# Patient Record
Sex: FEMALE | Race: WHITE | NOT HISPANIC OR LATINO | Employment: UNEMPLOYED | URBAN - METROPOLITAN AREA
[De-identification: names, ages, dates, MRNs, and addresses within clinical notes are randomized per-mention and may not be internally consistent; named-entity substitution may affect disease eponyms.]

---

## 2017-03-03 ENCOUNTER — GENERIC CONVERSION - ENCOUNTER (OUTPATIENT)
Dept: OTHER | Facility: OTHER | Age: 57
End: 2017-03-03

## 2017-05-26 ENCOUNTER — ALLSCRIPTS OFFICE VISIT (OUTPATIENT)
Dept: OTHER | Facility: OTHER | Age: 57
End: 2017-05-26

## 2017-05-26 DIAGNOSIS — Z12.31 ENCOUNTER FOR SCREENING MAMMOGRAM FOR MALIGNANT NEOPLASM OF BREAST: ICD-10-CM

## 2017-05-26 DIAGNOSIS — R92.2 INCONCLUSIVE MAMMOGRAM: ICD-10-CM

## 2018-01-09 NOTE — MISCELLANEOUS
Message   Recorded as Task   Date: 02/22/2016 10:47 AM, Created By: Goldy Ramsey   Task Name: Follow Up   Assigned To: Lauren Garcia   Regarding Patient: Kale Martin, Status: In Progress   CommentOleg Valerio - 22 Feb 2016 10:47 AM     TASK CREATED  Caller: Self; [phone] (Home); [phone] (Work)  see prior task  pt was to go back for 6m f/u US  script will not be mailed for reg screening  [facility] is aware of this    She called and lm for pt  did she go back for f/u? Vira Rios - 22 Feb 2016 10:49 AM     TASK EDITED  lmtcb  In Feb, pt was to go back in 6 mos for U/S and no record of it in chart  Did pt go back??   [facility] - 22 Feb 2016 10:49 AM     TASK IN PROGRESS   [facility] - 22 Feb 2016 2:09 PM     TASK EDITED  lmtcb   [facility] - 22 Feb 2016 2:41 PM     TASK EDITED  Pt did have mammo and u/s - f/u in aug  Am getting reports  Slip sent to pt for screening mammo   [facility] - 22 Feb 2016 3:24 PM     TASK EDITED  Mammo received from [facility]  Scanned into chart   Gloriantoinette Rios - 22 Feb 2016 3:26 PM     TASK EDITED  Pt absolutely "scared to death" to wait for mammo results once it is done  Irrational fear  I told pt to call [facility] and mri - see if mammo could be done there and get results immediately  Active Problems    1  Asymptomatic menopausal state (V49 81) (Z78 0)   2  Encounter for screening mammogram for malignant neoplasm of breast (V76 12)   (Z12 31)   3  Mammogram abnormal (793 80) (R92 8)   4  Visit for routine gyn exam (V72 31) (Z01 419)   5  Visit for screening mammogram (V76 12) (Z12 31)    Current Meds   1  ALPRAZolam 0 25 MG Oral Tablet; Therapy: [id number] to Recorded    Allergies    1  No Known Drug Allergies    Signatures   Electronically signed by :  Jules Shelton, ; Feb 22 2016  3:26PM EST                       (Author)

## 2018-01-11 NOTE — MISCELLANEOUS
Message   Recorded as Task   Date: 02/25/2016 01:07 PM, Created By: Mary Calvo   Task Name: Medical Complaint Callback   Assigned To: Rahel Fontenot   Regarding Patient: Haily Alva, Status: Active   Comment:    Mary Umesh - 25 Feb 2016 1:07 PM     TASK CREATED  pt called  She decided to go to [facility] anyway  Read last nurses note  She asked that rx be faxed to her home - at phone #  Script faxed        Active Problems    1  Asymptomatic menopausal state (V49 81) (Z78 0)   2  Encounter for screening mammogram for malignant neoplasm of breast (V76 12)   (Z12 31)   3  Mammogram abnormal (793 80) (R92 8)   4  Visit for routine gyn exam (V72 31) (Z01 419)   5  Visit for screening mammogram (V76 12) (Z12 31)    Current Meds   1  ALPRAZolam 0 25 MG Oral Tablet; Therapy: [id number] to Recorded    Allergies    1  No Known Drug Allergies    Signatures   Electronically signed by :  Carleen Shelton, ; Feb 25 2016  1:07PM EST                       (Author)

## 2018-01-14 VITALS
SYSTOLIC BLOOD PRESSURE: 138 MMHG | BODY MASS INDEX: 25.16 KG/M2 | DIASTOLIC BLOOD PRESSURE: 76 MMHG | HEIGHT: 63 IN | WEIGHT: 142 LBS

## 2018-07-08 PROBLEM — F41.0 PANIC DISORDER: Status: ACTIVE | Noted: 2017-03-16

## 2018-07-08 PROBLEM — F41.9 ANXIETY: Status: ACTIVE | Noted: 2017-03-16

## 2018-07-08 PROBLEM — R92.2 DENSE BREAST TISSUE: Status: ACTIVE | Noted: 2017-05-26

## 2018-07-08 PROBLEM — R92.30 DENSE BREAST TISSUE: Status: ACTIVE | Noted: 2017-05-26

## 2018-07-10 ENCOUNTER — ANNUAL EXAM (OUTPATIENT)
Dept: OBGYN CLINIC | Facility: MEDICAL CENTER | Age: 58
End: 2018-07-10
Payer: COMMERCIAL

## 2018-07-10 VITALS
RESPIRATION RATE: 16 BRPM | DIASTOLIC BLOOD PRESSURE: 90 MMHG | BODY MASS INDEX: 25.34 KG/M2 | SYSTOLIC BLOOD PRESSURE: 140 MMHG | WEIGHT: 143 LBS | HEIGHT: 63 IN

## 2018-07-10 DIAGNOSIS — Z12.31 ENCOUNTER FOR SCREENING MAMMOGRAM FOR MALIGNANT NEOPLASM OF BREAST: Primary | ICD-10-CM

## 2018-07-10 PROCEDURE — S0612 ANNUAL GYNECOLOGICAL EXAMINA: HCPCS | Performed by: OBSTETRICS & GYNECOLOGY

## 2018-07-10 RX ORDER — SERTRALINE HYDROCHLORIDE 25 MG/1
37.5 TABLET, FILM COATED ORAL DAILY
COMMUNITY

## 2018-07-10 NOTE — PROGRESS NOTES
7/10/2018    Assesment:    Problem List Items Addressed This Visit        Other    Encounter for screening mammogram for malignant neoplasm of breast - Primary      Her gyn exam is normal   a mammogram slip was given           Relevant Orders    Mammo screening bilateral w rohini Lira is a 62 y o  female who is here for a routine gyn exam,   has no new complaints   infrequent intercourse,  Not complete relief of dryness with use of lubricants       Patient Active Problem List   Diagnosis    Anxiety    Dense breast tissue    Mammogram abnormal    Panic disorder    Encounter for screening mammogram for malignant neoplasm of breast       Social History     Social History    Marital status: /Civil Union     Spouse name: N/A    Number of children: N/A    Years of education: N/A     Occupational History    Not on file  Social History Main Topics    Smoking status: Light Tobacco Smoker     Types: Cigarettes    Smokeless tobacco: Never Used      Comment: 1-9 cigs per day    Alcohol use No    Drug use: No    Sexual activity: Yes     Partners: Male     Birth control/ protection: Male Sterilization      Comment: partner had vasectomy     Other Topics Concern    Not on file     Social History Narrative    Daily coffee consumption (2 cups/day)    Exercising regularly    Lack of exercise         Family History   Problem Relation Age of Onset    Hypertension Mother     Hypertension Father     Lung cancer Father     Heart disease Maternal Grandfather      Past Medical History:   Diagnosis Date    Anxiety     Menopause     Normal delivery     1986 son       Current Outpatient Prescriptions:     sertraline (ZOLOFT) 25 mg tablet, Take 50 mg by mouth daily, Disp: , Rfl:       Bladder control: stress incontinence no                             urgency   no    1 Para 1001      admits to complaints with intercourse  Gynecologic History  No LMP recorded   Patient is postmenopausal   Last Pap: 2016  Results were: normal  Last mammogram: 2017  Results were: normal    The following portions of the patient's history were reviewed and updated as appropriate: allergies, current medications, past family history, past medical history, past social history, past surgical history and problem list     Review of Systems  Review of Systems   Constitutional: Negative for fatigue  Respiratory: Negative for shortness of breath  Cardiovascular: Negative for chest pain and palpitations  Gastrointestinal: Negative for abdominal distention, abdominal pain and constipation  Genitourinary: Negative for dyspareunia, frequency, hematuria and pelvic pain          Objective     /90 (BP Location: Left arm, Patient Position: Sitting, Cuff Size: Standard)   Resp 16   Ht 5' 3" (1 6 m)   Wt 64 9 kg (143 lb)   BMI 25 33 kg/m²     General Appearance:    Alert, cooperative, no distress, appears stated age                               Lungs:     Clear to auscultation bilaterally, respirations unlabored        Heart:    Regular rate and rhythm, S1 and S2 normal, no murmur, rub   or gallop   Breast Exam:  normal nipple, no mass, no skin change   Abdomen:     Soft, non-tender, bowel sounds active all four quadrants,     no masses, no organomegaly     Genitalia      :Vulva: normal, no lesions  Vagina: normal mucosa  Cervix: normal appearance  Uterus: normal size, normal shape and consistency  Adnexa: normal adnexa and no mass, fullness, tenderness     Rectal:   normal   Extremities:   Extremities normal, atraumatic, no cyanosis or edema       Skin:   Skin color, texture, turgor normal, no rashes or lesions   Lymph nodes:   Cervical, supraclavicular, and axillary nodes normal

## 2019-04-16 ENCOUNTER — TELEPHONE (OUTPATIENT)
Dept: OBGYN CLINIC | Facility: CLINIC | Age: 59
End: 2019-04-16

## 2019-04-16 DIAGNOSIS — R92.8 ABNORMAL MAMMOGRAM: ICD-10-CM

## 2019-04-16 DIAGNOSIS — R92.2 INCONCLUSIVE MAMMOGRAM: ICD-10-CM

## 2019-04-16 DIAGNOSIS — R92.2 DENSE BREAST TISSUE ON MAMMOGRAM: Primary | ICD-10-CM

## 2019-04-16 DIAGNOSIS — Z12.31 ENCOUNTER FOR SCREENING MAMMOGRAM FOR MALIGNANT NEOPLASM OF BREAST: ICD-10-CM

## 2019-08-05 ENCOUNTER — ANNUAL EXAM (OUTPATIENT)
Dept: OBGYN CLINIC | Facility: MEDICAL CENTER | Age: 59
End: 2019-08-05
Payer: COMMERCIAL

## 2019-08-05 VITALS — DIASTOLIC BLOOD PRESSURE: 76 MMHG | SYSTOLIC BLOOD PRESSURE: 130 MMHG | WEIGHT: 142.8 LBS | BODY MASS INDEX: 25.3 KG/M2

## 2019-08-05 DIAGNOSIS — Z01.419 ENCNTR FOR GYN EXAM (GENERAL) (ROUTINE) W/O ABN FINDINGS: Primary | ICD-10-CM

## 2019-08-05 DIAGNOSIS — Z12.39 SCREENING FOR MALIGNANT NEOPLASM OF BREAST: ICD-10-CM

## 2019-08-05 DIAGNOSIS — R92.8 ABNORMAL MAMMOGRAM: ICD-10-CM

## 2019-08-05 PROBLEM — R92.30 DENSE BREAST TISSUE: Status: RESOLVED | Noted: 2017-05-26 | Resolved: 2019-08-05

## 2019-08-05 PROBLEM — R92.2 DENSE BREAST TISSUE: Status: RESOLVED | Noted: 2017-05-26 | Resolved: 2019-08-05

## 2019-08-05 PROBLEM — Z12.31 ENCOUNTER FOR SCREENING MAMMOGRAM FOR MALIGNANT NEOPLASM OF BREAST: Status: RESOLVED | Noted: 2018-07-10 | Resolved: 2019-08-05

## 2019-08-05 PROCEDURE — S0612 ANNUAL GYNECOLOGICAL EXAMINA: HCPCS | Performed by: PHYSICIAN ASSISTANT

## 2019-08-05 RX ORDER — ALPRAZOLAM 0.25 MG/1
TABLET ORAL
Refills: 0 | COMMUNITY
Start: 2019-06-06

## 2019-08-05 NOTE — PROGRESS NOTES
Freddy Tara   1960    CC:  Yearly exam    S:  61 y o  female here for yearly exam  She is postmenopausal and has had no vaginal bleeding  She denies vaginal discharge, itching, odor or dryness  She is very infrequently sexually active with her ; they have been together since age 15 and she has always had pain with intercourse; she says this improved somewhat during their childbearing years but now is more of a problem  She uses coconut oil as a lubricant which she says works very well in terms of dryness  She has no pain at the introitus on initial penetration  She notes a deeper pain, like he is hitting a wall, when he is fully penetrated  She says her  saw vaginal dilators on the internet and suggested they try them  We discussed her issue as less likely treated with dilators since these are more for a diameter issue rather than a length issue  She does admit that she is very infrequently aroused prior to penetration and we discussed this as being more of the culprit  We discussed ways to become more aroused prior to penetration and she will try this  We discussed the use of a small bullet vibrator on her clitoris to help maintain arousal during intercourse  She says her  recently mentioned that his penis is developing a curve; I asked her to have him seen by urology for this  She is being followed for an abnormal mammogram earlier this year  We do not have a copy of her additional views, and once we receive those records, we will send her a slip for her next mammo    We reviewed ASCCP guidelines for Pap testing       Last Pap 5/20/16 neg/neg  Last Mammo 4/17/19  Last Colonoscopy 15 years ago     Family hx of breast cancer: no  Family hx of ovarian cancer: no  Family hx of colon cancer:  no    Current Outpatient Medications:     ALPRAZolam (XANAX) 0 25 mg tablet, , Disp: , Rfl: 0    sertraline (ZOLOFT) 25 mg tablet, Take 50 mg by mouth daily, Disp: , Rfl:   Social History Socioeconomic History    Marital status: /Civil Union     Spouse name: Not on file    Number of children: Not on file    Years of education: Not on file    Highest education level: Not on file   Occupational History    Not on file   Social Needs    Financial resource strain: Not on file    Food insecurity:     Worry: Not on file     Inability: Not on file    Transportation needs:     Medical: Not on file     Non-medical: Not on file   Tobacco Use    Smoking status: Light Tobacco Smoker     Types: Cigarettes    Smokeless tobacco: Never Used    Tobacco comment: 1-9 cigs per day   Substance and Sexual Activity    Alcohol use: No    Drug use: No    Sexual activity: Yes     Partners: Male     Birth control/protection: Male Sterilization     Comment: partner had vasectomy   Lifestyle    Physical activity:     Days per week: Not on file     Minutes per session: Not on file    Stress: Not on file   Relationships    Social connections:     Talks on phone: Not on file     Gets together: Not on file     Attends Yazidi service: Not on file     Active member of club or organization: Not on file     Attends meetings of clubs or organizations: Not on file     Relationship status: Not on file    Intimate partner violence:     Fear of current or ex partner: Not on file     Emotionally abused: Not on file     Physically abused: Not on file     Forced sexual activity: Not on file   Other Topics Concern    Not on file   Social History Narrative    Daily coffee consumption (2 cups/day)    Exercising regularly    Lack of exercise     Family History   Problem Relation Age of Onset    Hypertension Mother     Hypertension Father     Lung cancer Father     Heart disease Maternal Grandfather      Past Medical History:   Diagnosis Date    Anxiety     Menopause     Normal delivery     1986 son        Review of Systems   Respiratory: Negative  Cardiovascular: Negative      Gastrointestinal: Negative for constipation and diarrhea  Genitourinary: Negative for difficulty urinating, pelvic pain, vaginal bleeding, vaginal discharge, itching or odor  O:  Blood pressure 130/76, weight 64 8 kg (142 lb 12 8 oz)  Patient appears well and is not in distress  Neck is supple without masses  Breasts are symmetrical without mass, tenderness, nipple discharge, skin changes or adenopathy  Abdomen is soft and nontender without masses  External genitals are normal without lesions or rashes  Urethral meatus and urethra are normal  Bladder is normal to palpation  Vagina is normal without discharge or bleeding  Cervix is normal without discharge or lesion  Uterus is normal, mobile, nontender without palpable mass  Adnexa are normal, nontender, without palpable mass  A:   Yearly exam     Dyspareunia  P:   Pap 2021  Check left dx mammo and US in October 2019  RTO one year for yearly exam or sooner as needed

## 2019-08-06 DIAGNOSIS — R92.8 ABNORMAL MAMMOGRAM: ICD-10-CM

## 2019-10-18 ENCOUNTER — TELEPHONE (OUTPATIENT)
Dept: OBGYN CLINIC | Facility: CLINIC | Age: 59
End: 2019-10-18

## 2019-10-18 DIAGNOSIS — N60.02 CYST OF LEFT BREAST: Primary | ICD-10-CM

## 2019-10-18 NOTE — TELEPHONE ENCOUNTER
Patient called said she goes to [facility] to have her Mammograms done  Patient requested a mammo script as well as a script for an U/S of the left breast  Patient said she tried to schedule an appt an d was told she need a script

## 2020-08-17 ENCOUNTER — ANNUAL EXAM (OUTPATIENT)
Dept: OBGYN CLINIC | Facility: MEDICAL CENTER | Age: 60
End: 2020-08-17
Payer: COMMERCIAL

## 2020-08-17 VITALS
BODY MASS INDEX: 27.11 KG/M2 | HEIGHT: 63 IN | SYSTOLIC BLOOD PRESSURE: 160 MMHG | DIASTOLIC BLOOD PRESSURE: 82 MMHG | WEIGHT: 153 LBS | TEMPERATURE: 98.2 F

## 2020-08-17 DIAGNOSIS — Z12.31 ENCOUNTER FOR SCREENING MAMMOGRAM FOR MALIGNANT NEOPLASM OF BREAST: ICD-10-CM

## 2020-08-17 DIAGNOSIS — Z01.419 ENCNTR FOR GYN EXAM (GENERAL) (ROUTINE) W/O ABN FINDINGS: Primary | ICD-10-CM

## 2020-08-17 DIAGNOSIS — R92.8 ABNORMAL MAMMOGRAM: ICD-10-CM

## 2020-08-17 PROCEDURE — 87624 HPV HI-RISK TYP POOLED RSLT: CPT | Performed by: PHYSICIAN ASSISTANT

## 2020-08-17 PROCEDURE — G0145 SCR C/V CYTO,THINLAYER,RESCR: HCPCS | Performed by: PHYSICIAN ASSISTANT

## 2020-08-17 PROCEDURE — S0612 ANNUAL GYNECOLOGICAL EXAMINA: HCPCS | Performed by: PHYSICIAN ASSISTANT

## 2020-08-17 NOTE — PROGRESS NOTES
Clifford Juleitte   1960    CC:  Yearly exam    S:  61 y o  female here for yearly exam  She is postmenopausal and has had no vaginal bleeding  She denies vaginal discharge, itching, odor or dryness  Sexual activity: She is sexually active without pain, bleeding or dryness  Last Pap: 5/20/16 neg/neg  Last Mammo:  4/12/19 neg  Last Colonoscopy:  never     We reviewed ASC guidelines for Pap testing       Family hx of breast cancer: no  Family hx of ovarian cancer: no  Family hx of colon cancer: no    Current Outpatient Medications:     ALPRAZolam (XANAX) 0 25 mg tablet, , Disp: , Rfl: 0    sertraline (ZOLOFT) 25 mg tablet, Take 37 5 mg by mouth daily , Disp: , Rfl:   Social History     Socioeconomic History    Marital status: /Civil Union     Spouse name: Not on file    Number of children: Not on file    Years of education: Not on file    Highest education level: Not on file   Occupational History    Not on file   Social Needs    Financial resource strain: Not on file    Food insecurity     Worry: Not on file     Inability: Not on file   [employer] needs     Medical: Not on file     Non-medical: Not on file   Tobacco Use    Smoking status: Current Every Day Smoker     Types: Cigarettes    Smokeless tobacco: Never Used    Tobacco comment: 1-9 cigs per day   Substance and Sexual Activity    Alcohol use: No    Drug use: No    Sexual activity: Yes     Partners: Male     Birth control/protection: Male Sterilization     Comment: partner had vasectomy   Lifestyle    Physical activity     Days per week: Not on file     Minutes per session: Not on file    Stress: Not on file   Relationships    Social connections     Talks on phone: Not on file     Gets together: Not on file     Attends Church service: Not on file     Active member of club or organization: Not on file     Attends meetings of clubs or organizations: Not on file     Relationship status: Not on file    Intimate partner violence     Fear of current or ex partner: Not on file     Emotionally abused: Not on file     Physically abused: Not on file     Forced sexual activity: Not on file   Other Topics Concern    Not on file   Social History Narrative    Daily coffee consumption (2 cups/day)    Exercising regularly    Lack of exercise     Family History   Problem Relation Age of Onset    Hypertension Mother     Hypertension Father     Lung cancer Father     Heart disease Maternal Grandfather      Past Medical History:   Diagnosis Date    Anxiety     Menopause     Normal delivery     1986 son        Review of Systems   Respiratory: Negative  Cardiovascular: Negative  Gastrointestinal: Negative for constipation and diarrhea  Genitourinary: Negative for difficulty urinating, pelvic pain, vaginal bleeding, vaginal discharge, itching or odor  O:  Blood pressure 160/82, temperature 98 2 °F (36 8 °C), temperature source Temporal, height 5' 2 99" (1 6 m), weight 69 4 kg (153 lb)  Patient appears well and is not in distress  Neck is supple without masses  Breasts are symmetrical without mass, tenderness, nipple discharge, skin changes or adenopathy  Abdomen is soft and nontender without masses  External genitals are normal without lesions or rashes  Urethral meatus and urethra are normal  Bladder is normal to palpation  Vagina is normal without discharge or bleeding  Cervix is normal without discharge or lesion  Uterus is normal, mobile, nontender without palpable mass  Adnexa are normal, nontender, without palpable mass  A:  Yearly exam      P:   Pap and HPV today   Mammo slip given   Colonoscopy due - plans to do Cologuard     RTO one year for yearly exam or sooner as needed

## 2020-08-18 LAB
HPV HR 12 DNA CVX QL NAA+PROBE: NEGATIVE
HPV16 DNA CVX QL NAA+PROBE: NEGATIVE
HPV18 DNA CVX QL NAA+PROBE: NEGATIVE

## 2020-08-20 LAB
LAB AP GYN PRIMARY INTERPRETATION: NORMAL
Lab: NORMAL

## 2020-10-16 ENCOUNTER — APPOINTMENT (OUTPATIENT)
Dept: ULTRASOUND IMAGING | Facility: CLINIC | Age: 60
End: 2020-10-16
Payer: COMMERCIAL

## 2020-10-16 ENCOUNTER — HOSPITAL ENCOUNTER (OUTPATIENT)
Dept: MAMMOGRAPHY | Facility: CLINIC | Age: 60
Discharge: HOME | End: 2020-10-16
Payer: COMMERCIAL

## 2020-10-16 VITALS — BODY MASS INDEX: 27.11 KG/M2 | TEMPERATURE: 97.8 F | WEIGHT: 153 LBS | HEIGHT: 63 IN

## 2020-10-16 DIAGNOSIS — Z12.31 ENCOUNTER FOR SCREENING MAMMOGRAM FOR MALIGNANT NEOPLASM OF BREAST: ICD-10-CM

## 2020-10-16 PROCEDURE — 77063 BREAST TOMOSYNTHESIS BI: CPT

## 2020-10-16 PROCEDURE — 77067 SCR MAMMO BI INCL CAD: CPT

## 2021-10-25 ENCOUNTER — ANNUAL EXAM (OUTPATIENT)
Dept: OBGYN CLINIC | Facility: MEDICAL CENTER | Age: 61
End: 2021-10-25
Payer: COMMERCIAL

## 2021-10-25 VITALS
WEIGHT: 149.8 LBS | HEIGHT: 63 IN | BODY MASS INDEX: 26.54 KG/M2 | DIASTOLIC BLOOD PRESSURE: 100 MMHG | SYSTOLIC BLOOD PRESSURE: 164 MMHG

## 2021-10-25 DIAGNOSIS — Z12.11 COLON CANCER SCREENING: ICD-10-CM

## 2021-10-25 DIAGNOSIS — Z12.31 ENCOUNTER FOR SCREENING MAMMOGRAM FOR MALIGNANT NEOPLASM OF BREAST: ICD-10-CM

## 2021-10-25 DIAGNOSIS — Z01.419 ENCNTR FOR GYN EXAM (GENERAL) (ROUTINE) W/O ABN FINDINGS: Primary | ICD-10-CM

## 2021-10-25 PROCEDURE — S0612 ANNUAL GYNECOLOGICAL EXAMINA: HCPCS | Performed by: PHYSICIAN ASSISTANT

## 2022-03-02 ENCOUNTER — TELEPHONE (OUTPATIENT)
Dept: OBGYN CLINIC | Facility: CLINIC | Age: 62
End: 2022-03-02

## 2022-03-08 ENCOUNTER — HOSPITAL ENCOUNTER (OUTPATIENT)
Dept: MAMMOGRAPHY | Facility: CLINIC | Age: 62
Discharge: HOME | End: 2022-03-08
Payer: COMMERCIAL

## 2022-03-08 VITALS — BODY MASS INDEX: 26.4 KG/M2 | HEIGHT: 63 IN | WEIGHT: 149 LBS

## 2022-03-08 DIAGNOSIS — Z12.31 ENCOUNTER FOR SCREENING MAMMOGRAM FOR MALIGNANT NEOPLASM OF BREAST: ICD-10-CM

## 2022-03-08 PROCEDURE — 77067 SCR MAMMO BI INCL CAD: CPT

## 2022-03-08 PROCEDURE — 77063 BREAST TOMOSYNTHESIS BI: CPT

## 2023-01-03 ENCOUNTER — ANNUAL EXAM (OUTPATIENT)
Dept: OBGYN CLINIC | Facility: MEDICAL CENTER | Age: 63
End: 2023-01-03

## 2023-01-03 VITALS
SYSTOLIC BLOOD PRESSURE: 160 MMHG | WEIGHT: 152.4 LBS | DIASTOLIC BLOOD PRESSURE: 96 MMHG | BODY MASS INDEX: 27 KG/M2 | HEIGHT: 63 IN

## 2023-01-03 DIAGNOSIS — Z12.31 ENCOUNTER FOR SCREENING MAMMOGRAM FOR MALIGNANT NEOPLASM OF BREAST: ICD-10-CM

## 2023-01-03 DIAGNOSIS — Z01.419 ROUTINE GYNECOLOGICAL EXAMINATION: Primary | ICD-10-CM

## 2023-01-03 DIAGNOSIS — R03.0 ELEVATED BLOOD PRESSURE READING IN OFFICE WITHOUT DIAGNOSIS OF HYPERTENSION: ICD-10-CM

## 2023-01-03 DIAGNOSIS — Z12.11 ENCOUNTER FOR SCREENING FOR MALIGNANT NEOPLASM OF COLON: ICD-10-CM

## 2023-01-03 DIAGNOSIS — N95.2 ATROPHIC VAGINITIS: ICD-10-CM

## 2023-01-03 NOTE — PROGRESS NOTES
Assessment   58 y o  postmenopausal female presenting for annual exam      Plan   Diagnoses and all orders for this visit:    Routine gynecological examination  Normal findings on routine exam    considerations reviewed  Aware of sx to report  Breast awareness/SBE encouraged  Encouraged 150 min of exercise per week  Reviewed balanced diet  Vitamin D and Calcium supplement recommended  Encounter for screening for malignant neoplasm of colon  -     Ambulatory referral to Gastroenterology; Future    She is due for a colonoscopy  We discussed this in detail today  She is aware that colonoscopy is the gold standard for detecting colon cancer  She is aware that colon cancer is generally regarded as a preventable cancer if polyps are found prior to turning cancerous  We also discussed yearly FIT testing versus q 3 year Cologuard testing for patients who are considered low-risk  She was strongly recommended to schedule colon cancer screening as soon as possible  Encounter for screening mammogram for malignant neoplasm of breast  -     Mammo screening bilateral w 3d & cad; Future    Elevated blood pressure reading in office without diagnosis of hypertension    170/96 on arrival, decreased to 160/96 at discharge  Admittedly is very anxious - has to take a xanax before she comes for doctors appts  Also has been very stressed due to her MIL and mother both having dementia and being their power of   She is not symptomatic of this elevation  Will plan to have rechecked by her PCP  Aware of sx to report  Atrophic vaginitis  Exam consistent with atrophic vaginitis  We discussed options to relieve sx     We reviewed various tx options to include use of lubricant (silicone based or coconut oil) during IC and prevention with coconut oil, hyaluronic acid suppository, and/or topical estrogen cream She declines to start estrace therapy at this time but is aware to call if this is something she desires in the future  She will trial other measures and report back if sx not relieving with this  Pap due   Mammo slip given   Colonoscopy due - referral given      RTO one year for yearly exam or sooner as needed  __________________________________________________________________      Subjective     Noemí Fountain is a 58 y o  postmenopausal female presenting for annual exam  She is without complaint and does not want STD testing today  Increased stress recently due to her mother and MIL both having late stages of dementia  She and  are their power of   Admittedly does not have much time for herself because of this  SCREENING  Last Pap: 2020 NILM/hpv neg  Last Mammo: 2022 BIRADS 2 - Benign  Last Colonoscopy: about 15 years ago  Last DEXA: never    GYN  Denies postmenopausal vaginal bleeding, dryness, vaginal discharge, itching, odor, pelvic pain and vulvar/vaginal symptoms    Menarche at age 15  Menopause at age 52  Menopausal symptoms denies    Sexually active: Yes - single partner -   Sexual concerns - reports dryness - only somewhat relived with use of coconut oil  Was active with her  last night  They use a new toy that was very irritation and notably has some soreness of vulva today  Hx STI: denies     Hx Abnormal pap: denies  We reviewed ASCCP guidelines for Pap testing today  OB          Complaints: denies  Denies leakage/difficulty urinating, dysuria, hematuria, and urinary frequency/urgency      BREAST  Complaints: denies  Denies: breast lump, breast tenderness, dryness, nipple discharge, pruritus, skin color change, and skin lesion(s)  Personal hx: none reported    GENERAL  PMH reviewed/updated and is as below  Patient does follow with a PCP      Pertinent Family Hx:   Family hx of breast cancer: PGM, paternal aunt, maternal aunt x 2  Family hx of ovarian cancer: no  Family hx of colon cancer: no      Past Medical History:   Diagnosis Date   • Anxiety    • Menopause    • Normal delivery     1986 son       Past Surgical History:   Procedure Laterality Date   • COLONOSCOPY      15 years ago   • DILATION AND CURETTAGE OF UTERUS     • LAPAROSCOPY  10/09/1987    chronic pelvic pain         Current Outpatient Medications:   •  ALPRAZolam (XANAX) 0 25 mg tablet, , Disp: , Rfl: 0  •  sertraline (ZOLOFT) 25 mg tablet, Take 37 5 mg by mouth daily , Disp: , Rfl:     No Known Allergies    Social History     Socioeconomic History   • Marital status: /Civil Union     Spouse name: Not on file   • Number of children: Not on file   • Years of education: Not on file   • Highest education level: Not on file   Occupational History   • Not on file   Tobacco Use   • Smoking status: Every Day     Packs/day: 0 50     Years: 40 00     Pack years: 20 00     Types: Cigarettes   • Smokeless tobacco: Never   • Tobacco comments:     1-9 cigs per day   Vaping Use   • Vaping Use: Never used   Substance and Sexual Activity   • Alcohol use: No   • Drug use: No   • Sexual activity: Yes     Partners: Male     Birth control/protection: Male Sterilization     Comment: partner had vasectomy   Other Topics Concern   • Not on file   Social History Narrative    Daily coffee consumption (2 cups/day)    Exercising regularly    Lack of exercise     Social Determinants of Health     Financial Resource Strain: Not on file   Food Insecurity: Not on file   Transportation Needs: Not on file   Physical Activity: Not on file   Stress: Not on file   Social Connections: Not on file   Intimate Partner Violence: Not on file   Housing Stability: Not on file         Review of Systems     ROS:  Constitutional: Negative for appetite change, fatigue and unexpected weight change  Respiratory: Negative  Cardiovascular: Negative  Gastrointestinal: Negative for abdominal pain and change in bowel habits/constipation/diarrhea  Breasts:  Negative other than as noted above    Genitourinary: Negative other than as noted above  Psychiatric: Negative for mood difficulties  Objective      /96   Ht 5' 2 75" (1 594 m)   Wt 69 1 kg (152 lb 6 4 oz)   BMI 27 21 kg/m²         Physical Exam  Genitourinary:         Comments: Atrophic changes noted  Labia minora are fused b/l with abrasions as noted above consistent with hx of intercourse last night  Patient appears well and is not in distress  Neck is supple without masses  Breasts are symmetrical without mass, tenderness, nipple discharge, skin changes or adenopathy  Abdomen is soft and nontender without masses  Urethral meatus and urethra are normal  Bladder is normal to palpation  Vagina is without discharge or bleeding  Cervix is without discharge or lesion  Uterus is normal, mobile, nontender without palpable mass  Adnexa are normal, nontender, without palpable mass

## 2023-01-03 NOTE — PATIENT INSTRUCTIONS
For vaginal dryness (prevention)    Coconut oil (organic, pure, unscented) as needed for moisture or lubrication  ( Do not use if allergic)  Revaree hyaluronic acid suppository   Replens moisture restore external comfort gel daily ( use as directed on the box)     Replens long lasting vaginal moisturizer  ( use as directed on the box)       For Vaginal Lubrication (during intercourse)      Coconut oil (Do not use if allergic)           Silicone based lubricant:  Uber Lube  AstroGlide  Replens silky smooth lubricant, premium silicone based lubricant for intercourse  ( use as directed, a small amount will provide an enhanced natural feeling)        Hyaluronic acid suppository - Revaree from [employer]     To order: go to the website- [facility]  Or call or text [phone]  Select product  Use coupon code [id number] for discount on first prescription

## 2023-09-27 ENCOUNTER — HOSPITAL ENCOUNTER (OUTPATIENT)
Dept: RADIOLOGY | Facility: MEDICAL CENTER | Age: 63
Discharge: HOME | End: 2023-09-27
Payer: COMMERCIAL

## 2023-09-27 VITALS — WEIGHT: 152 LBS | BODY MASS INDEX: 26.93 KG/M2 | HEIGHT: 63 IN

## 2023-09-27 DIAGNOSIS — Z12.31 ENCOUNTER FOR SCREENING MAMMOGRAM FOR MALIGNANT NEOPLASM OF BREAST: ICD-10-CM

## 2023-09-27 PROCEDURE — 77063 BREAST TOMOSYNTHESIS BI: CPT

## 2023-09-27 PROCEDURE — 77067 SCR MAMMO BI INCL CAD: CPT

## 2024-02-26 NOTE — PROGRESS NOTES
Assessment   63 y.o. postmenopausal female presenting for annual exam.     Plan   Diagnoses and all orders for this visit:    Routine gynecological examination  Normal findings on routine exam.   considerations reviewed. Aware of sx to report.   Breast awareness/SBE encouraged.  Encouraged 150 min of exercise per week.  Reviewed balanced diet.   Vitamin D and Calcium supplement recommended.     Encounter for screening mammogram for malignant neoplasm of breast  -     Mammo screening bilateral w 3d & cad; Future    Vulvar irritation    - clobetasol (TEMOVATE) 0.05 % ointment; Apply once daily for 4-6 weeks  Dispense: 30 g; Refill: 1    Reviewed irritation of left labia minora on exam. Was present at last annual and believed to be due to friction from intercourse. Declines estrace therapy. Recommended once daily application of clobetasol ointment x 4 weeks. RTO for recheck. Discussed potential biopsy if unchanged. Agreeable.     Pap - due 2025  Mammo slip given   Colonoscopy due 2026      RTO one year for yearly exam or sooner as needed.      __________________________________________________________________      Subjective     Delaneysyl Anderson is a 63 y.o. postmenopausal female presenting for annual exam.     Rough time recently as mom and mother-in-law passed away within 6 months of each other. They were 89. 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   Not exercising regularly. Knows she needs to and wants to walk more.     SCREENING  Last Pap: 08/17/2020 NILM/hpv neg   Last Mammo: 09/27/2023 BIRADS 2 - Benign  Last Colonoscopy: 11/3/2023 3 year recall   Last DEXA: n/a     GYN  Denies postmenopausal vaginal bleeding, dryness, vaginal discharge, itching, odor, pelvic pain and vulvar/vaginal symptoms    Sexually active: Yes - single partner -   Concerns: some dryness. Managed with use of a lubricant. Declines estrogen treatment. Denies pain or bleeding  STD  testing declines    Hx Abnormal pap: denies  We reviewed ASCCP guidelines for Pap testing today.       OB        Complaints: denies  Denies leakage/difficulty urinating, dysuria, hematuria, and urinary frequency/urgency    BREAST  Complaints: denies  Denies: breast lump, breast tenderness, dryness, nipple discharge, pruritus, skin color change, and skin lesion(s)    Pertinent Family Hx:   Family hx of breast cancer: PGM, paternal aunt, maternal aunt x 2  Family hx of ovarian cancer: no  Family hx of colon cancer: no      Past Medical History:   Diagnosis Date    Anxiety     Menopause     Normal delivery     1986 son       Past Surgical History:   Procedure Laterality Date    COLONOSCOPY      15 years ago    DILATION AND CURETTAGE OF UTERUS      LAPAROSCOPY  10/09/1987    chronic pelvic pain         Current Outpatient Medications:     ALPRAZolam (XANAX) 0.25 mg tablet, if needed for anxiety, Disp: , Rfl: 0    clobetasol (TEMOVATE) 0.05 % ointment, Apply once daily for 4-6 weeks, Disp: 30 g, Rfl: 1    sertraline (ZOLOFT) 25 mg tablet, Take 37.5 mg by mouth daily , Disp: , Rfl:     No Known Allergies    Social History     Socioeconomic History    Marital status: /Civil Union     Spouse name: Not on file    Number of children: Not on file    Years of education: Not on file    Highest education level: Not on file   Occupational History    Not on file   Tobacco Use    Smoking status: Every Day     Current packs/day: 0.50     Average packs/day: 0.5 packs/day for 40.0 years (20.0 ttl pk-yrs)     Types: Cigarettes    Smokeless tobacco: Never    Tobacco comments:     1-9 cigs per day   Vaping Use    Vaping status: Never Used   Substance and Sexual Activity    Alcohol use: No    Drug use: No    Sexual activity: Yes     Partners: Male     Birth control/protection: Male Sterilization, Post-menopausal     Comment: partner had vasectomy   Other Topics Concern    Not on file   Social History Narrative    Daily  "coffee consumption (2 cups/day)    Exercising regularly    Lack of exercise     Social Determinants of Health     Financial Resource Strain: Not on file   Food Insecurity: Not on file   Transportation Needs: Not on file   Physical Activity: Not on file   Stress: Not on file   Social Connections: Not on file   Intimate Partner Violence: Not on file   Housing Stability: Not on file         Review of Systems   Constitutional: Negative.   Respiratory: Negative.    Cardiovascular: Negative   Gastrointestinal: Negative   Breasts: As noted above.   Genitourinary: As noted above.   Psychiatric: Negative       Objective      /70 (BP Location: Right arm, Patient Position: Sitting, Cuff Size: Standard)   Pulse (!) 111   Ht 5' 3\" (1.6 m)   Wt 66.7 kg (147 lb)   BMI 26.04 kg/m²     Physical Examination:  Patient appears well and is not in distress  Breasts are symmetrical without mass, tenderness, nipple discharge, skin changes or adenopathy.   Abdomen is soft and nontender without masses.   External genitals- cord like erythema of the upper 1/2 of the left labia minora.   Urethral meatus and urethra are normal  Bladder is normal to palpation  Vagina is normal without discharge or bleeding.   Cervix is normal without discharge or lesion.   Uterus is normal, mobile, nontender without palpable mass.  Adnexa are normal, nontender, without palpable mass.     "

## 2024-02-27 ENCOUNTER — ANNUAL EXAM (OUTPATIENT)
Dept: OBGYN CLINIC | Facility: MEDICAL CENTER | Age: 64
End: 2024-02-27
Payer: COMMERCIAL

## 2024-02-27 VITALS
DIASTOLIC BLOOD PRESSURE: 70 MMHG | WEIGHT: 147 LBS | HEART RATE: 111 BPM | SYSTOLIC BLOOD PRESSURE: 150 MMHG | BODY MASS INDEX: 26.05 KG/M2 | HEIGHT: 63 IN

## 2024-02-27 DIAGNOSIS — N90.89 VULVAR IRRITATION: ICD-10-CM

## 2024-02-27 DIAGNOSIS — Z12.31 ENCOUNTER FOR SCREENING MAMMOGRAM FOR MALIGNANT NEOPLASM OF BREAST: ICD-10-CM

## 2024-02-27 DIAGNOSIS — Z01.419 ROUTINE GYNECOLOGICAL EXAMINATION: Primary | ICD-10-CM

## 2024-02-27 PROCEDURE — 99396 PREV VISIT EST AGE 40-64: CPT | Performed by: PHYSICIAN ASSISTANT

## 2024-02-27 RX ORDER — CLOBETASOL PROPIONATE 0.5 MG/G
OINTMENT TOPICAL
Qty: 30 G | Refills: 1 | Status: SHIPPED | OUTPATIENT
Start: 2024-02-27

## 2024-04-09 ENCOUNTER — OFFICE VISIT (OUTPATIENT)
Dept: OBGYN CLINIC | Facility: MEDICAL CENTER | Age: 64
End: 2024-04-09
Payer: COMMERCIAL

## 2024-04-09 VITALS — BODY MASS INDEX: 26.32 KG/M2 | WEIGHT: 148.6 LBS

## 2024-04-09 DIAGNOSIS — N90.89 VULVAR IRRITATION: Primary | ICD-10-CM

## 2024-04-09 PROCEDURE — 99212 OFFICE O/P EST SF 10 MIN: CPT | Performed by: PHYSICIAN ASSISTANT

## 2024-04-09 NOTE — PROGRESS NOTES
Delaney FLORES Saco  1960      S:  63 y.o. female here for follow up of vulvar irritation. Has some erythema of left labia minora present on annual exam 2/27/2024, which was also noted on prior annual. Initially thought to be irritation from recent intercourse, however, with persistence was advised trial of steroid application. Has been applying clobetasol ointment once daily for the past 6 weeks. As she was asymptomatic to begin with, unsure if there has been any change.    Is sexually active with her . Uses lubricant for vaginal dryness, but has no pain.     Current Outpatient Medications:     ALPRAZolam (XANAX) 0.25 mg tablet, if needed for anxiety, Disp: , Rfl: 0    clobetasol (TEMOVATE) 0.05 % ointment, Apply once daily for 4-6 weeks, Disp: 30 g, Rfl: 1    sertraline (ZOLOFT) 25 mg tablet, Take 37.5 mg by mouth daily , Disp: , Rfl:   Social History     Socioeconomic History    Marital status: /Civil Union     Spouse name: Not on file    Number of children: Not on file    Years of education: Not on file    Highest education level: Not on file   Occupational History    Not on file   Tobacco Use    Smoking status: Every Day     Current packs/day: 0.50     Average packs/day: 0.5 packs/day for 40.0 years (20.0 ttl pk-yrs)     Types: Cigarettes    Smokeless tobacco: Never    Tobacco comments:     1-9 cigs per day   Vaping Use    Vaping status: Never Used   Substance and Sexual Activity    Alcohol use: No    Drug use: No    Sexual activity: Yes     Partners: Male     Birth control/protection: Male Sterilization, Post-menopausal     Comment: partner had vasectomy   Other Topics Concern    Not on file   Social History Narrative    Daily coffee consumption (2 cups/day)    Exercising regularly    Lack of exercise     Social Determinants of Health     Financial Resource Strain: Not on file   Food Insecurity: Not on file   Transportation Needs: Not on file   Physical Activity: Not on file   Stress: Not on file    Social Connections: Not on file   Intimate Partner Violence: Not on file   Housing Stability: Not on file     Family History   Problem Relation Age of Onset    Hypertension Mother     Heart failure Mother         CHF    Seizures Mother     Hypertension Father     Lung cancer Father     Cancer Father     No Known Problems Sister     No Known Problems Sister     Stomach cancer Maternal Grandmother     Heart disease Maternal Grandfather     Heart attack Maternal Grandfather     Breast cancer Paternal Grandmother     Brain cancer Paternal Grandfather     Cancer Maternal Aunt     Breast cancer Maternal Aunt         70's    Breast cancer Maternal Aunt 70    Breast cancer Paternal Aunt     Pancreatic cancer Paternal Aunt     Lung cancer Paternal Aunt      Past Medical History:   Diagnosis Date    Anxiety     Menopause     Normal delivery     1986 son       ROS:  Constitutional: Negative.  Genitourinary:  + vulvar irritation. Negative for difficulty urinating, frequency, urgency, pelvic pain, vaginal discharge, odor or itching.      O:  Weight 67.4 kg (148 lb 9.6 oz).    She appears well and is in no distress  Normocephalic, atraumatic.   Abdomen is soft and nontender  External genitals are normal without lesions or rashes. Previously noted vulvar irritation appears to have resolved. Varicosity noted in this area, that does appear to be swollen/ prominent, creating a skinfold that extends beyond labia.   No focal neurological deficits.   Normal mood, affect, and behavior.     A/P:  Diagnoses and all orders for this visit:    Vulvar irritation      Has resolved. Suspect that prominence of tissue creates an area of recurrent friction from IC, clothing, activity, etc. Encouraged to avoid tight fitting clothing and go without underwear when possible. Recommended continued monitoring. F/u with any irritation or concerns. Otherwise RTO for annual exam, sooner prn.

## 2024-06-10 DIAGNOSIS — N90.89 VULVAR IRRITATION: ICD-10-CM

## 2024-06-10 RX ORDER — CLOBETASOL PROPIONATE 0.5 MG/G
OINTMENT TOPICAL
Qty: 30 G | Refills: 1 | Status: SHIPPED | OUTPATIENT
Start: 2024-06-10

## 2024-06-24 DIAGNOSIS — N90.89 VULVAR IRRITATION: ICD-10-CM

## 2024-06-24 RX ORDER — CLOBETASOL PROPIONATE 0.5 MG/G
OINTMENT TOPICAL
Qty: 30 G | Refills: 1 | OUTPATIENT
Start: 2024-06-24

## 2024-07-06 DIAGNOSIS — N90.89 VULVAR IRRITATION: ICD-10-CM

## 2024-07-08 RX ORDER — CLOBETASOL PROPIONATE 0.5 MG/G
OINTMENT TOPICAL
Qty: 30 G | Refills: 1 | Status: SHIPPED | OUTPATIENT
Start: 2024-07-08

## 2024-07-22 DIAGNOSIS — N90.89 VULVAR IRRITATION: ICD-10-CM

## 2024-07-22 RX ORDER — CLOBETASOL PROPIONATE 0.5 MG/G
OINTMENT TOPICAL
Qty: 30 G | Refills: 1 | Status: SHIPPED | OUTPATIENT
Start: 2024-07-22

## 2024-09-17 DIAGNOSIS — N90.89 VULVAR IRRITATION: ICD-10-CM

## 2024-09-18 RX ORDER — CLOBETASOL PROPIONATE 0.5 MG/G
OINTMENT TOPICAL
Qty: 30 G | Refills: 1 | Status: SHIPPED | OUTPATIENT
Start: 2024-09-18

## 2024-11-02 DIAGNOSIS — N90.89 VULVAR IRRITATION: ICD-10-CM

## 2024-11-04 RX ORDER — CLOBETASOL PROPIONATE 0.5 MG/G
OINTMENT TOPICAL
Qty: 30 G | Refills: 1 | Status: SHIPPED | OUTPATIENT
Start: 2024-11-04

## 2024-12-18 DIAGNOSIS — N90.89 VULVAR IRRITATION: ICD-10-CM

## 2024-12-18 RX ORDER — CLOBETASOL PROPIONATE 0.5 MG/G
OINTMENT TOPICAL
Qty: 30 G | Refills: 5 | Status: SHIPPED | OUTPATIENT
Start: 2024-12-18

## 2025-02-10 ENCOUNTER — HOSPITAL ENCOUNTER (OUTPATIENT)
Dept: RADIOLOGY | Facility: MEDICAL CENTER | Age: 65
Discharge: HOME | End: 2025-02-10
Payer: COMMERCIAL

## 2025-02-10 VITALS — HEIGHT: 63 IN | WEIGHT: 148 LBS | BODY MASS INDEX: 26.22 KG/M2

## 2025-02-10 DIAGNOSIS — Z12.31 ENCOUNTER FOR SCREENING MAMMOGRAM FOR MALIGNANT NEOPLASM OF BREAST: ICD-10-CM

## 2025-02-10 PROCEDURE — 77063 BREAST TOMOSYNTHESIS BI: CPT

## 2025-02-10 PROCEDURE — 77067 SCR MAMMO BI INCL CAD: CPT

## 2025-04-01 ENCOUNTER — ANNUAL EXAM (OUTPATIENT)
Dept: OBGYN CLINIC | Facility: MEDICAL CENTER | Age: 65
End: 2025-04-01
Payer: COMMERCIAL

## 2025-04-01 VITALS
SYSTOLIC BLOOD PRESSURE: 136 MMHG | DIASTOLIC BLOOD PRESSURE: 94 MMHG | WEIGHT: 147 LBS | HEIGHT: 62 IN | BODY MASS INDEX: 27.05 KG/M2

## 2025-04-01 DIAGNOSIS — Z11.51 SCREENING FOR HUMAN PAPILLOMAVIRUS (HPV): ICD-10-CM

## 2025-04-01 DIAGNOSIS — Z12.31 ENCOUNTER FOR SCREENING MAMMOGRAM FOR MALIGNANT NEOPLASM OF BREAST: ICD-10-CM

## 2025-04-01 DIAGNOSIS — Z01.419 ENCOUNTER FOR ANNUAL ROUTINE GYNECOLOGICAL EXAMINATION: Primary | ICD-10-CM

## 2025-04-01 DIAGNOSIS — Z12.4 SCREENING FOR CERVICAL CANCER: ICD-10-CM

## 2025-04-01 DIAGNOSIS — N90.89 VULVAR IRRITATION: ICD-10-CM

## 2025-04-01 PROCEDURE — G0476 HPV COMBO ASSAY CA SCREEN: HCPCS | Performed by: PHYSICIAN ASSISTANT

## 2025-04-01 PROCEDURE — G0145 SCR C/V CYTO,THINLAYER,RESCR: HCPCS | Performed by: PHYSICIAN ASSISTANT

## 2025-04-01 PROCEDURE — 99396 PREV VISIT EST AGE 40-64: CPT | Performed by: PHYSICIAN ASSISTANT

## 2025-04-01 RX ORDER — CLOBETASOL PROPIONATE 0.5 MG/G
OINTMENT TOPICAL
Qty: 30 G | Refills: 3 | Status: SHIPPED | OUTPATIENT
Start: 2025-04-01

## 2025-04-01 NOTE — PROGRESS NOTES
Name: Delaney Anderson      : 1960      MRN: [id number]  Encounter Provider: Jen Calix PA-C  Encounter Date: 2025   Encounter department: [facility]  :  Assessment & Plan  Encounter for annual routine gynecological examination  Cervical cancer screening:  Normal cervical exam. Pap done today.    STD screening:  Patient declines.   Breast cancer screening:  Normal breast exam. Reviewed breast self-awareness. Mammo ordered.   Colon cancer screening:  Up to date. Due   Depression Screening: Patient's depression screening was assessed with a PHQ-2 score of 0.   BMI Counseling: Body mass index is 26.67 kg/m². Discussed diet and exercise recommendations.  Return to office 1 year for annual exam, sooner PRN / otherwise directed .         Encounter for screening mammogram for malignant neoplasm of breast    Orders:    Mammo screening bilateral w 3d and cad; Future    Screening for cervical cancer    Orders:    Liquid-based pap, screening    Screening for human papillomavirus (HPV)    Orders:    Liquid-based pap, screening    Vulvar irritation  Exam normal. Refill sent to pharmacy. Continue PRN use. Call with frequent flares or worsening sx.   Orders:    clobetasol (TEMOVATE) 0.05 % ointment; Apply twice daily for up to 2 weeks as needed for flare.        History of Present Illness   Delaney Anderson is a 64 y.o. postmenopausal female presenting for annual exam.     She is without complaint and denies  bleeding . Not exercising regularly. 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 Enjoys reading in her free time. Currently reading a book on anxiety but not enjoying.     Uses clobetasol ointment prn vulvar irritation. Applies no more than once per month, if that.     Last Pap: 2020 NILM/hpv neg. Due today   Last Mammo: 02/10/2025 BIRADS 2 - Benign, fibroglandular density   Last Colonoscopy:  - 3 year recall  Last DEXA: n/a     Sexually  "active: with her . Dryness improved and acceptable with revaree and use of lubricant.   Concerns: denies pain, bleeding    Hx Abnormal pap: none   We reviewed ASCCP guidelines for Pap testing today.     No breast or urinary concerns.    Pertinent Family Hx:   Family hx of breast cancer: MGM, PGM, paternal aunt, maternal aunt x 2  Family hx of ovarian cancer: no  Family hx of colon cancer: no          Review of Systems   Constitutional: Negative.         No breast concerns.   Gastrointestinal: Negative.    Genitourinary:  Negative for difficulty urinating, dysuria, frequency, pelvic pain, urgency, vaginal bleeding and vaginal discharge.   Skin: Negative.    Neurological:  Negative for headaches.   Psychiatric/Behavioral: Negative.       Medical History Reviewed by provider this encounter:     .  Current Outpatient Medications on File Prior to Visit   Medication Sig Dispense Refill    ALPRAZolam (XANAX) 0.25 mg tablet if needed for anxiety  0    sertraline (ZOLOFT) 25 mg tablet Take 37.5 mg by mouth daily       [DISCONTINUED] clobetasol (TEMOVATE) 0.05 % ointment APPLY ONCE DAILY FOR 6 WEEKS 30 g 5     No current facility-administered medications on file prior to visit.      Social History     Tobacco Use    Smoking status: Every Day     Current packs/day: 0.50     Average packs/day: 0.5 packs/day for 40.0 years (20.0 ttl pk-yrs)     Types: Cigarettes    Smokeless tobacco: Never    Tobacco comments:     1-9 cigs per day   Vaping Use    Vaping status: Never Used   Substance and Sexual Activity    Alcohol use: No    Drug use: No    Sexual activity: Yes     Partners: Male     Birth control/protection: Male Sterilization     Comment: partner had vasectomy        Objective   /94 (BP Location: Left arm, Patient Position: Sitting, Cuff Size: Standard)   Ht 5' 2.25\" (1.581 m)   Wt 66.7 kg (147 lb)   BMI 26.67 kg/m²      Physical Exam  Vitals and nursing note reviewed.   Constitutional:       General: She is " not in acute distress.     Appearance: Normal appearance.   HENT:      Head: Normocephalic and atraumatic.   Eyes:      Conjunctiva/sclera: Conjunctivae normal.   Pulmonary:      Effort: Pulmonary effort is normal.   Chest:   Breasts:     Breasts are symmetrical.      Right: Normal.      Left: Normal.   Abdominal:      General: There is no distension.      Palpations: Abdomen is soft.      Tenderness: There is no abdominal tenderness.   Genitourinary:     General: Normal vulva.      Pubic Area: No rash.       Labia:         Right: No rash or lesion.         Left: No rash or lesion.       Vagina: Normal.      Cervix: Normal.      Uterus: Normal.       Adnexa: Right adnexa normal and left adnexa normal.      Comments: Atrophic tissue changes. More comfortable with small speculum.   Lymphadenopathy:      Upper Body:      Right upper body: No supraclavicular or axillary adenopathy.      Left upper body: No supraclavicular or axillary adenopathy.      Lower Body: No right inguinal adenopathy. No left inguinal adenopathy.   Skin:     General: Skin is warm and dry.   Neurological:      General: No focal deficit present.      Mental Status: She is alert.      Cranial Nerves: No cranial nerve deficit.   Psychiatric:         Mood and Affect: Mood normal.         Behavior: Behavior normal.

## 2025-04-07 LAB
LAB AP GYN PRIMARY INTERPRETATION: NORMAL
Lab: NORMAL

## 2025-04-09 ENCOUNTER — RESULTS FOLLOW-UP (OUTPATIENT)
Dept: OBGYN CLINIC | Facility: CLINIC | Age: 65
End: 2025-04-09